# Patient Record
Sex: FEMALE | Race: WHITE
[De-identification: names, ages, dates, MRNs, and addresses within clinical notes are randomized per-mention and may not be internally consistent; named-entity substitution may affect disease eponyms.]

---

## 2018-09-10 ENCOUNTER — HOSPITAL ENCOUNTER (OUTPATIENT)
Dept: HOSPITAL 96 - M.ULTRA | Age: 53
Discharge: HOME | End: 2018-09-10
Attending: NURSE PRACTITIONER
Payer: COMMERCIAL

## 2018-09-10 DIAGNOSIS — E04.1: Primary | ICD-10-CM

## 2018-09-13 NOTE — PATH
[address]  [address]                    PATHOLOGY RPT PROCEDURE       
_______________________________________________________________________________
 
Name:       CAROL COLBERT               Room:                      REG CLI 
M.R.#:  [id number]      Account #:      [id number]  
Admission:  09/10/18     Date of Birth:  [birth year]  
Discharge:                             Report #:    [id number]
                                                         Path Case #: [id number]
_______________________________________________________________________________
Note
[facility] Accession Number: [id number]
   TESTS               RESULT  FLAG  UNITS    REF RANGE  LAB
------------------------------------------------------------
   Clinician Provided Cytology Information
   No. of containers..01 Other (Miscellaneous)
Source:                                                   01
   RIGHT THYROID
DIAGNOSIS:                                                02
   RIGHT THYROID
   INCONCLUSIVE.
   BETHESDA CATEGORY III. ATYPIA OF UNDETERMINED SIGNIFICANCE.
   ABUNDANT HURTHLE CELLS WITH SCATTERED ATYPIA. SEE COMMENT.
   THIS INTERPRETATION INCLUDES EVALUATION OF A CELL BLOCK.
   COMMENT: A RETAIN RNA SAMPLE WILL BE FORWARDED FOR ANALYSIS AND WILL BE
   THE SUBJECT OF A SEPARATE REPORT.
Pathologist ICD10:                                        02
   R89.6
Signed out by:                                            Zuleyka Mojica MD, Pathologist
   NPI- [id number]
Performed by:                                             Johnathan Mcbride, Cytotechnologist ([facility])
Gross description:                                        01
   15 ML, RED, CLEAR
   /LCS
 
------------------------------------------------------------
    FLAG LEGEND:
    L-Low Normal,H-High Normal,LL-Alert Low,HH-Alert High
    <-Panic Low,>-Panic High,A-Abnormal,AA-Critical Abnormal
------------------------------------------------------------
 
Performed at:
[address]
   [place]  [address]
   Yusef Will MD, Phone: [phone]
[address] [place] [address]
   [address] [place]  [address]
   Bret Mojica MD, Phone: [phone]
***Performed at:  01
   [address], [place], [place]  [id number]
   MD Yusef Will MD Phone:  [phone]

## 2018-09-13 NOTE — PATH
[address]  [address]                    PATHOLOGY RPT PROCEDURE       
_______________________________________________________________________________
 
Name:       CAROL COLBERT               Room:                      REG CLI 
M.R.#:  [id number]      Account #:      [id number]  
Admission:  09/10/18     Date of Birth:  [birth year]  
Discharge:                             Report #:    [id number]
                                                         Path Case #: [id number]
_______________________________________________________________________________
Note
[facility] Accession Number: [id number]
   TESTS               RESULT  FLAG  UNITS    REF RANGE  LAB
------------------------------------------------------------
   Clinician Provided Cytology Information
   No. of containers..01 Other (Miscellaneous)
Source:                                                   01
   LEFT THYROID
Clinician ICD10:                                          01
   E04.1
DIAGNOSIS:                                                02
   LEFT THYROID
   INCONCLUSIVE.
   BETHESDA CATEGORY III. ATYPIA OF UNDETERMINED SIGNIFICANCE.
   PROMINENCE OF HURTHLE CELLS WITH SCATTERED ATYPIA. SEE COMMENT.
   THIS INTERPRETATION INCLUDES EVALUATION OF A CELL BLOCK.
   COMMENT: A RETAIN RNA SAMPLE WILL BE FORWRDED FOR ANALYSIS AND WILL BE THE
   SUBJECT OF A SEPARATE REPORT.
Pathologist ICD10:                                        02
   R89.6
Signed out by:                                            02
   Bret Mojica MD, Pathologist
   NPI- [id number]
Performed by:                                             Johnathan Mcbride, Cytotechnologist ([facility])
Gross description:                                        01
   10 ML, RED, CLEAR
   /LCS
 
------------------------------------------------------------
    FLAG LEGEND:
    L-Low Normal,H-High Normal,LL-Alert Low,HH-Alert High
    <-Panic Low,>-Panic High,A-Abnormal,AA-Critical Abnormal
------------------------------------------------------------
 
Performed at:
[address]
   [place]  [address]
   Yusef Will MD, Phone: [phone]
[address]  [address]
   Bret Mojica MD, Phone: [phone]
Specimen Comment: A courtesy copy of this report has been sent to
Specimen Comment: [phone].
Specimen Comment: Report sent to 
***Performed at:  01
   [address], [place], [place]  [id number]
 
[place] [address]                    PATHOLOGY RPT PROCEDURE       
_______________________________________________________________________________
 
Name:       CAROL COLBERT               Room:                      REG SASCHA LAWSON#:  [id number]      Account #:      [id number]  
Admission:  09/10/18     Date of Birth:  [birth year]  
Discharge:                             Report #:    [id number]
                                                         Path Case #: [id number]
_______________________________________________________________________________
   MD Yusef Will MD Phone:  [phone]

## 2021-10-30 ENCOUNTER — HOSPITAL ENCOUNTER (EMERGENCY)
Dept: HOSPITAL 96 - M.ERS | Age: 56
Discharge: HOME | End: 2021-10-30
Payer: COMMERCIAL

## 2021-10-30 VITALS — DIASTOLIC BLOOD PRESSURE: 74 MMHG | SYSTOLIC BLOOD PRESSURE: 121 MMHG

## 2021-10-30 VITALS — WEIGHT: 214.99 LBS | BODY MASS INDEX: 33.74 KG/M2 | HEIGHT: 67 IN

## 2021-10-30 DIAGNOSIS — R42: Primary | ICD-10-CM

## 2021-10-30 DIAGNOSIS — F17.210: ICD-10-CM

## 2021-10-30 DIAGNOSIS — H92.01: ICD-10-CM

## 2021-10-30 DIAGNOSIS — Z20.822: ICD-10-CM

## 2021-10-30 DIAGNOSIS — R11.2: ICD-10-CM

## 2021-10-30 LAB
ABSOLUTE BASOPHILS: 0.1 THOU/UL (ref 0–0.2)
ABSOLUTE EOSINOPHILS: 0.4 THOU/UL (ref 0–0.7)
ABSOLUTE MONOCYTES: 0.6 THOU/UL (ref 0–1.2)
ANION GAP SERPL CALC-SCNC: 13 MMOL/L (ref 7–16)
BASOPHILS NFR BLD AUTO: 0.9 %
BUN SERPL-MCNC: 19 MG/DL (ref 7–18)
CALCIUM SERPL-MCNC: 9.7 MG/DL (ref 8.5–10.1)
CHLORIDE SERPL-SCNC: 105 MMOL/L (ref 98–107)
CO2 SERPL-SCNC: 26 MMOL/L (ref 21–32)
CREAT SERPL-MCNC: 1 MG/DL (ref 0.6–1.3)
EOSINOPHIL NFR BLD: 3.5 %
GLUCOSE SERPL-MCNC: 146 MG/DL (ref 70–99)
GRANULOCYTES NFR BLD MANUAL: 41.3 %
HCT VFR BLD CALC: 40.8 % (ref 37–47)
HGB BLD-MCNC: 13.8 GM/DL (ref 12–15)
LYMPHOCYTES # BLD: 5.1 THOU/UL (ref 0.8–5.3)
LYMPHOCYTES NFR BLD AUTO: 48.2 %
MCH RBC QN AUTO: 28.7 PG (ref 26–34)
MCHC RBC AUTO-ENTMCNC: 33.9 G/DL (ref 28–37)
MCV RBC: 84.6 FL (ref 80–100)
MONOCYTES NFR BLD: 6.1 %
MPV: 9.9 FL. (ref 7.2–11.1)
NEUTROPHILS # BLD: 4.4 THOU/UL (ref 1.6–8.1)
NUCLEATED RBCS: 0 /100WBC
PLATELET COUNT*: 275 THOU/UL (ref 150–400)
POTASSIUM SERPL-SCNC: 3.3 MMOL/L (ref 3.5–5.1)
RBC # BLD AUTO: 4.82 MIL/UL (ref 4.2–5)
RDW-CV: 14.9 % (ref 10.5–14.5)
SODIUM SERPL-SCNC: 144 MMOL/L (ref 136–145)
WBC # BLD AUTO: 10.6 THOU/UL (ref 4–11)

## 2021-10-31 NOTE — EKG
[place] [address]
Phone:  [phone]                     ELECTROCARDIOGRAM REPORT      
_______________________________________________________________________________
 
Name:         CAROL COLBERT              Room:                     [facility]#:    [id number]     Account #:     [id number]  
Admission:    10/30/21    Attend Phys:                     
Discharge:    10/30/21    Date of Birth: [birth year]  
Date of Service: 10/30/21 190  Report #:      [id number]
        [id number]
_______________________________________________________________________________
THIS REPORT FOR:  //name//                      
 
                         [facility] ED
                                       
Test Date:    2021-10-30               Test Time:    19:06:13
Pat Name:     CAROL COLBERT         Department:   
Patient ID:   [id number]            Room:          
Gender:       F                        Technician:   FL
:          1965               Requested By: Debra Sahu
Order Number: [id number] MD:   Jaden Shen
                                 Measurements
Intervals                              Axis          
Rate:         73                       P:            39
IL:           147                      QRS:          -34
QRSD:         105                      T:            25
QT:           427                                    
QTc:          471                                    
                           Interpretive Statements
Sinus rhythm
Left axis deviation
Baseline wander in lead(s) V5
No previous ECG available for comparison
Electronically Signed On 10- 12:11:35 CDT by Jaden Shen
[email or web address]
 
 
 
 
 
 
 
 
 
 
 
 
 
 
 
 
 
 
 
 
  <ELECTRONICALLY SIGNED>
                                           By: Jaden Shen MD, [facility]   
  [id number]
D: 10/30/21 1906   _____________________________________
T: 10/30/21 1906   Jaden Shen MD, [facility]     /EPI